# Patient Record
Sex: FEMALE | Race: WHITE | NOT HISPANIC OR LATINO | Employment: PART TIME | URBAN - NONMETROPOLITAN AREA
[De-identification: names, ages, dates, MRNs, and addresses within clinical notes are randomized per-mention and may not be internally consistent; named-entity substitution may affect disease eponyms.]

---

## 2023-05-23 NOTE — PROGRESS NOTES
"Chief Complaint  Establish Care and Vaginal Itching (Pt c/o vaginal itching/burning. Ongoing since Saturday./)    Subjective          Preston Le presents to [facility]  History of Present Illness  Here to establish care. Former pt of PTC.     ASD: She does follow with a cardiologist every 5 years.   Vaginal Discharge  The patient's primary symptoms include genital itching and vaginal discharge. The patient's pertinent negatives include no genital lesions. Pertinent negatives include no dysuria or hematuria. The vaginal discharge was white. She is sexually active. It is unknown whether or not her partner has an STD. She uses an IUD for contraception. Her past medical history is significant for vaginosis (during pregnancy). There is no history of an STD.       Objective   Vital Signs:   /71 (BP Location: Right arm, Patient Position: Sitting)   Pulse 69   Ht 149.9 cm (59\")   Wt 69.3 kg (152 lb 12.8 oz)   BMI 30.86 kg/m²     Physical Exam  Constitutional:       General: She is not in acute distress.     Appearance: Normal appearance. She is normal weight.   HENT:      Head: Normocephalic.   Eyes:      Pupils: Pupils are equal, round, and reactive to light.      Visual Fields: Right eye visual fields normal and left eye visual fields normal.   Neck:      Trachea: Trachea normal.   Cardiovascular:      Rate and Rhythm: Normal rate and regular rhythm.      Heart sounds: Normal heart sounds.   Pulmonary:      Effort: Pulmonary effort is normal.      Breath sounds: Normal breath sounds and air entry.   Musculoskeletal:      Right lower leg: No edema.      Left lower leg: No edema.   Skin:     General: Skin is warm and dry.   Neurological:      Mental Status: She is alert and oriented to person, place, and time.   Psychiatric:         Mood and Affect: Mood and affect normal.         Behavior: Behavior normal.         Thought Content: Thought content normal.        Result Review " :   The following data was reviewed by: CONRADO Gruber on 05/24/2023:                  Assessment and Plan    Diagnoses and all orders for this visit:    1. Encounter to establish care (Primary)    2. Vaginal discharge  -     Gardnerella vaginalis, Trichomonas vaginalis, Candida albicans, DNA - Swab, Vagina; Future  -     Chlamydia trachomatis, Neisseria gonorrhoeae, PCR - Urine, Urine, Random Void; Future    3. Vaginal itching  -     Chlamydia trachomatis, Neisseria gonorrhoeae, PCR - Urine, Urine, Random Void; Future    We have discussed what the vaginal swab is looking for and how positive results are treated. Will also test for GC/chlamydia.      Follow Up   Return for Pending test results.  Patient was given instructions and counseling regarding her condition or for health maintenance advice. Please see specific information pulled into the AVS if appropriate.

## 2023-05-24 ENCOUNTER — OFFICE VISIT (OUTPATIENT)
Dept: FAMILY MEDICINE CLINIC | Age: 23
End: 2023-05-24
Payer: COMMERCIAL

## 2023-05-24 ENCOUNTER — LAB (OUTPATIENT)
Dept: LAB | Facility: HOSPITAL | Age: 23
End: 2023-05-24
Payer: COMMERCIAL

## 2023-05-24 VITALS
BODY MASS INDEX: 30.8 KG/M2 | HEIGHT: 59 IN | DIASTOLIC BLOOD PRESSURE: 71 MMHG | WEIGHT: 152.8 LBS | SYSTOLIC BLOOD PRESSURE: 111 MMHG | HEART RATE: 69 BPM

## 2023-05-24 DIAGNOSIS — N89.8 VAGINAL DISCHARGE: ICD-10-CM

## 2023-05-24 DIAGNOSIS — Z76.89 ENCOUNTER TO ESTABLISH CARE: Primary | ICD-10-CM

## 2023-05-24 DIAGNOSIS — N89.8 VAGINAL ITCHING: ICD-10-CM

## 2023-05-24 DIAGNOSIS — B96.89 BV (BACTERIAL VAGINOSIS): Primary | ICD-10-CM

## 2023-05-24 DIAGNOSIS — N76.0 BV (BACTERIAL VAGINOSIS): Primary | ICD-10-CM

## 2023-05-24 LAB
CANDIDA SPECIES: NEGATIVE
GARDNERELLA VAGINALIS: POSITIVE
T VAGINALIS DNA VAG QL PROBE+SIG AMP: NEGATIVE

## 2023-05-24 PROCEDURE — 87660 TRICHOMONAS VAGIN DIR PROBE: CPT | Performed by: NURSE PRACTITIONER

## 2023-05-24 PROCEDURE — 87510 GARDNER VAG DNA DIR PROBE: CPT | Performed by: NURSE PRACTITIONER

## 2023-05-24 PROCEDURE — 87480 CANDIDA DNA DIR PROBE: CPT | Performed by: NURSE PRACTITIONER

## 2023-05-24 RX ORDER — ONDANSETRON 4 MG/1
TABLET, FILM COATED ORAL
COMMUNITY
Start: 2022-12-14 | End: 2023-05-24

## 2023-05-24 RX ORDER — METRONIDAZOLE 500 MG/1
500 TABLET ORAL 2 TIMES DAILY
Qty: 14 TABLET | Refills: 0 | Status: SHIPPED | OUTPATIENT
Start: 2023-05-24 | End: 2023-05-31

## 2023-05-24 RX ORDER — FLUDROCORTISONE ACETATE 0.1 MG/1
0.1 TABLET ORAL DAILY
Qty: 30 TABLET | Refills: 11 | COMMUNITY
Start: 2022-05-24 | End: 2023-05-24

## 2023-07-21 ENCOUNTER — TELEPHONE (OUTPATIENT)
Dept: FAMILY MEDICINE CLINIC | Age: 23
End: 2023-07-21

## 2023-07-21 DIAGNOSIS — A60.9 HSV (HERPES SIMPLEX VIRUS) ANOGENITAL INFECTION: Primary | ICD-10-CM

## 2023-07-21 RX ORDER — VALACYCLOVIR HYDROCHLORIDE 1 G/1
1000 TABLET, FILM COATED ORAL 2 TIMES DAILY
Qty: 20 TABLET | Refills: 0 | Status: SHIPPED | OUTPATIENT
Start: 2023-07-21 | End: 2023-07-31

## 2023-07-21 NOTE — TELEPHONE ENCOUNTER
Caller: Preston Le    Relationship: Self    Best call back number: [phone]     Requested Prescriptions:   VALACICLOVIR - 1 GRAM - TWICE PER DAY FOR 10 DAYS       Pharmacy where request should be sent: [place] [id number] [address] [facility] - [phone]  - [phone] FX     Last office visit with prescribing clinician: 5/24/2023   Last telemedicine visit with prescribing clinician: Visit date not found   Next office visit with prescribing clinician: Visit date not found       Does the patient have less than a 3 day supply:  [x] Yes  [] No    Would you like a call back once the refill request has been completed: [x] Yes [] No    If the office needs to give you a call back, can they leave a voicemail: [x] Yes [] No    Marciano Adams Rep   07/21/23 11:10 EDT

## 2023-09-06 ENCOUNTER — TELEPHONE (OUTPATIENT)
Dept: FAMILY MEDICINE CLINIC | Age: 23
End: 2023-09-06

## 2023-09-06 DIAGNOSIS — A60.9 HSV (HERPES SIMPLEX VIRUS) ANOGENITAL INFECTION: Primary | ICD-10-CM

## 2023-09-06 RX ORDER — VALACYCLOVIR HYDROCHLORIDE 1 G/1
1000 TABLET, FILM COATED ORAL DAILY
Qty: 90 TABLET | Refills: 1 | Status: SHIPPED | OUTPATIENT
Start: 2023-09-06

## 2023-09-06 RX ORDER — VALACYCLOVIR HYDROCHLORIDE 1 G/1
1000 TABLET, FILM COATED ORAL 2 TIMES DAILY
Qty: 20 TABLET | Refills: 0 | Status: CANCELLED | OUTPATIENT
Start: 2023-09-06

## 2023-09-06 NOTE — TELEPHONE ENCOUNTER
Caller: Preston Le    Relationship: Self    Best call back number: [phone]    Requested Prescriptions:   Requested Prescriptions      No prescriptions requested or ordered in this encounter    BIG BLUE PILL    [place] [facility] [place] [address] [facility] [address]  - [phone]  - [phone] FX     Last office visit with prescribing clinician: 5/24/2023   Last telemedicine visit with prescribing clinician: Visit date not found   Next office visit with prescribing clinician: Visit date not found     Additional details provided by patient: PATIENT SAYS ITS USED FOR HSV.  PATIENT TAKE LAST DOSE TODAY 9.6.23.    PATIENT IS ASKING FOR 90 DAY SUPPLY AND 3 REFILLS     Does the patient have less than a 3 day supply:  [x] Yes  [] No    Would you like a call back once the refill request has been completed: [] Yes [] No    If the office needs to give you a call back, can they leave a voicemail: [] Yes [] No    Ngoc Ivey, PCT   09/06/23 11:23 EDT          No

## 2023-09-06 NOTE — TELEPHONE ENCOUNTER
If you think that would help better, she would like preventative dosing, since she has had it so many times.

## 2023-09-06 NOTE — TELEPHONE ENCOUNTER
Spoke with pt regarding out break, pt states she is having multiple genital blisters, please adv.    VALCYLOVIR 1000MG #20 LF-7/21/23, pt would like a 90ds, please adv.

## 2023-10-30 ENCOUNTER — OFFICE VISIT (OUTPATIENT)
Dept: FAMILY MEDICINE CLINIC | Age: 23
End: 2023-10-30
Payer: COMMERCIAL

## 2023-10-30 ENCOUNTER — TELEPHONE (OUTPATIENT)
Dept: FAMILY MEDICINE CLINIC | Age: 23
End: 2023-10-30

## 2023-10-30 VITALS
TEMPERATURE: 97.7 F | HEIGHT: 59 IN | DIASTOLIC BLOOD PRESSURE: 64 MMHG | WEIGHT: 141 LBS | BODY MASS INDEX: 28.43 KG/M2 | HEART RATE: 90 BPM | SYSTOLIC BLOOD PRESSURE: 106 MMHG

## 2023-10-30 DIAGNOSIS — R10.9 CRAMP, ABDOMINAL: Primary | ICD-10-CM

## 2023-10-30 DIAGNOSIS — R10.32 LLQ PAIN: ICD-10-CM

## 2023-10-30 LAB
B-HCG UR QL: NEGATIVE
EXPIRATION DATE: NORMAL
INTERNAL NEGATIVE CONTROL: NORMAL
INTERNAL POSITIVE CONTROL: NORMAL
Lab: NORMAL

## 2023-10-30 PROCEDURE — 81025 URINE PREGNANCY TEST: CPT | Performed by: NURSE PRACTITIONER

## 2023-10-30 PROCEDURE — 1159F MED LIST DOCD IN RCRD: CPT | Performed by: NURSE PRACTITIONER

## 2023-10-30 PROCEDURE — 1160F RVW MEDS BY RX/DR IN RCRD: CPT | Performed by: NURSE PRACTITIONER

## 2023-10-30 PROCEDURE — 99213 OFFICE O/P EST LOW 20 MIN: CPT | Performed by: NURSE PRACTITIONER

## 2023-10-30 RX ORDER — ESCITALOPRAM OXALATE 10 MG/1
1 TABLET ORAL DAILY
COMMUNITY
Start: 2023-10-20

## 2023-10-30 NOTE — TELEPHONE ENCOUNTER
Pt seen in office today and adv to go to ED due to severe abdominal cramping, vaginal bleeding, hcg neg. Upon exam LLQ very tender. Vs given to Tanna at [facility] ED. Pt reports IUD without menses since 7/2022 only occasional spotting.

## 2023-10-30 NOTE — PROGRESS NOTES
"Chief Complaint  Abdominal Cramping (Severe abdominal cramping since this morning in lower stomach. When she got up to use the bathroom some blood came out in toilet but that was it. She said she hasn't had a period in a year and a half. )    Subjective          Preston Le presents to [facility]  History of Present Illness  She reports having severe cramping this morning. She reports that it woke her up from the sleep. She reports that that when she went to use the toilet, blood came out of her vaginal area. The blood was bright to dark red in color. She took naproxen, which did help with her cramping. She has not had any more episodes of bleeding. She does have a IUD, which has been in place since July 2022. She does not check her strings. Her last menstrual period has been almost a year.       Objective   Vital Signs:   /64 (BP Location: Left arm, Patient Position: Sitting, Cuff Size: Adult)   Pulse 90   Temp 97.7 °F (36.5 °C) (Oral)   Ht 149.9 cm (59.02\")   Wt 64 kg (141 lb)   BMI 28.46 kg/m²     Physical Exam  Constitutional:       General: She is not in acute distress.     Appearance: Normal appearance. She is normal weight.   HENT:      Head: Normocephalic.   Eyes:      Pupils: Pupils are equal, round, and reactive to light.      Visual Fields: Right eye visual fields normal and left eye visual fields normal.   Neck:      Trachea: Trachea normal.   Cardiovascular:      Rate and Rhythm: Normal rate and regular rhythm.      Heart sounds: Normal heart sounds.   Pulmonary:      Effort: Pulmonary effort is normal.      Breath sounds: Normal breath sounds and air entry.   Abdominal:      General: There is no distension.      Palpations: There is no mass.      Tenderness: There is abdominal tenderness (generalized, but worse in LLQ).   Musculoskeletal:      Right lower leg: No edema.      Left lower leg: No edema.   Skin:     General: Skin is warm and dry. "   Neurological:      Mental Status: She is alert and oriented to person, place, and time.   Psychiatric:         Mood and Affect: Mood and affect normal.         Behavior: Behavior normal.         Thought Content: Thought content normal.        Result Review :   The following data was reviewed by: CONRADO Gruber on 10/30/2023:                  Assessment and Plan    Diagnoses and all orders for this visit:    1. Cramp, abdominal (Primary)  -     POCT pregnancy, urine    2. LLQ pain    Upon laying down, the patient complained of severe pain in her lower abdomen.  She was extremely tender upon palpation, especially in the left lower quadrant region.  Due to this, I recommended going to the ED so she can get stat imaging done, along with blood work.  We have also discussed that it may be gynecological in nature, but could be other things so that is why she needs to have stat imaging.  Her urine pregnancy test was negative.  She verbalized understanding and is going to [facility].  We have called report to [facility].      Follow Up   No follow-ups on file.  Patient was given instructions and counseling regarding her condition or for health maintenance advice. Please see specific information pulled into the AVS if appropriate.

## 2024-03-04 ENCOUNTER — OFFICE VISIT (OUTPATIENT)
Dept: FAMILY MEDICINE CLINIC | Age: 24
End: 2024-03-04
Payer: COMMERCIAL

## 2024-03-04 VITALS
DIASTOLIC BLOOD PRESSURE: 71 MMHG | HEIGHT: 59 IN | TEMPERATURE: 98.4 F | OXYGEN SATURATION: 98 % | SYSTOLIC BLOOD PRESSURE: 102 MMHG | BODY MASS INDEX: 25.32 KG/M2 | WEIGHT: 125.6 LBS | HEART RATE: 107 BPM

## 2024-03-04 DIAGNOSIS — N76.0 ACUTE VAGINITIS: Primary | ICD-10-CM

## 2024-03-04 LAB
CANDIDA SPECIES: POSITIVE
GARDNERELLA VAGINALIS: NEGATIVE
T VAGINALIS DNA VAG QL PROBE+SIG AMP: NEGATIVE

## 2024-03-04 PROCEDURE — 87660 TRICHOMONAS VAGIN DIR PROBE: CPT | Performed by: NURSE PRACTITIONER

## 2024-03-04 PROCEDURE — 99213 OFFICE O/P EST LOW 20 MIN: CPT | Performed by: NURSE PRACTITIONER

## 2024-03-04 PROCEDURE — 87510 GARDNER VAG DNA DIR PROBE: CPT | Performed by: NURSE PRACTITIONER

## 2024-03-04 PROCEDURE — 87480 CANDIDA DNA DIR PROBE: CPT | Performed by: NURSE PRACTITIONER

## 2024-03-04 RX ORDER — FLUCONAZOLE 150 MG/1
150 TABLET ORAL
Qty: 2 TABLET | Refills: 0 | Status: SHIPPED | OUTPATIENT
Start: 2024-03-04 | End: 2024-03-12

## 2024-03-04 NOTE — PROGRESS NOTES
Chief Complaint  Preston Le presents to [facility] for Vaginal Itching (HSV, pt states this is different from her usual outbreaks /Painful to wipe X 3 days /Redness & itchiness X 3 days )      Subjective     History of Present Illness    Vaginitis: Patient complains of an abnormal vaginal irritation for 3 days. Vaginal symptoms include local irritation, pain, and vulvar itching.STI Risk: HSV current diagnosis but takes valtrex daily.  Discharge described as: normal and physiologic..Menstrual pattern: She had been bleeding no periods due to OC. Contraception: OCP (estrogen/progesterone)  no sexual intercourse  did recently started on tx for strep with antibiotic but unsure as the name (allergy to amoxicillin).  History of BV           Assessment and Plan       Diagnoses and all orders for this visit:    1. Acute vaginitis (Primary)  Comments:  WILL TREAT AS YEAST DUE TO RECENT ABX USE- PENDING VAG SCREEN  CONSIDER BV SINCE HISTORY AND ADVISE TO USE OTC MONISTAT EXTERNALLY  Orders:  -     Gardnerella vaginalis, Trichomonas vaginalis, Candida albicans, DNA - Swab, Vagina; Future  -     Gardnerella vaginalis, Trichomonas vaginalis, Candida albicans, DNA - Swab, Vagina  -     fluconazole (Diflucan) 150 MG tablet; Take 1 tablet by mouth Every 7 (Seven) Days for 2 doses.  Dispense: 2 tablet; Refill: 0            Follow Up   Return if symptoms worsen or fail to improve.  No future appointments.    New Medications Ordered This Visit   Medications    fluconazole (Diflucan) 150 MG tablet     Sig: Take 1 tablet by mouth Every 7 (Seven) Days for 2 doses.     Dispense:  2 tablet     Refill:  0       There are no discontinued medications.       Review of Systems    Objective     Vitals:    03/04/24 1319   BP: 102/71   BP Location: Left arm   Patient Position: Sitting   Cuff Size: Adult   Pulse: 107   Temp: 98.4 °F (36.9 °C)   TempSrc: Oral   SpO2: 98%   Weight: 57 kg (125 lb 9.6 oz)   Height:  "149.9 cm (59.02\")     Body mass index is 25.35 kg/m².          Physical Exam  Constitutional:       General: She is not in acute distress.     Appearance: Normal appearance.   HENT:      Head: Normocephalic.   Cardiovascular:      Rate and Rhythm: Normal rate and regular rhythm.   Pulmonary:      Effort: Pulmonary effort is normal.      Breath sounds: Normal breath sounds.   Genitourinary:     Pubic Area: No rash.       Labia:         Right: Tenderness present. No lesion.         Left: No rash or lesion.       Vagina: Vaginal discharge (THICK WHITE) present.       Musculoskeletal:         General: Normal range of motion.   Neurological:      General: No focal deficit present.      Mental Status: She is alert and oriented to person, place, and time.   Psychiatric:         Mood and Affect: Mood normal.         Behavior: Behavior normal.            Result Review               Allergies   Allergen Reactions    Amoxicillin Anaphylaxis, Hives and Rash     Category: Allergy;     Other reaction(s): anaphylasis, Rash  Category: Allergy;     Other reaction(s): anaphylasis, Rash      Bupropion Anxiety and Hives     Category: Allergy;   Other reaction(s): hives  Category: Allergy;   Other reaction(s): hives      Vancomycin Itching and Rash     Anger      Past Medical History:   Diagnosis Date    Allergic     Anemia     Anxiety     Depression     Headache     Heart murmur      Current Outpatient Medications   Medication Sig Dispense Refill    escitalopram (LEXAPRO) 10 MG tablet Take 1 tablet by mouth Daily.      Levonorgestrel 20.1 MCG/DAY intrauterine device 1 Device by Intrauterine route.      valACYclovir (Valtrex) 1000 MG tablet Take 1 tablet by mouth Daily. 90 tablet 1    fluconazole (Diflucan) 150 MG tablet Take 1 tablet by mouth Every 7 (Seven) Days for 2 doses. 2 tablet 0     No current facility-administered medications for this visit.     Past Surgical History:   Procedure Laterality Date    UMBILICAL HERNIA REPAIR      "   Health Maintenance Due   Topic Date Due    Pneumococcal Vaccine 0-64 (1 of 2 - PCV) Never done    HPV VACCINES (1 - 3-dose series) Never done    HEPATITIS C SCREENING  Never done    ANNUAL PHYSICAL  Never done    PAP SMEAR  Never done    INFLUENZA VACCINE  08/01/2023    COVID-19 Vaccine (1 - 2023-24 season) Never done      Immunization History   Administered Date(s) Administered    Influenza Whole 12/26/2015    Tdap 09/27/2016, 08/03/2021    Varicella 12/17/2016         Part of this note may be an electronic transcription/translation of spoken language to printed   text using the Dragon Dictation System.      Gissel Mcrae, APRN

## 2024-03-08 DIAGNOSIS — A60.9 HSV (HERPES SIMPLEX VIRUS) ANOGENITAL INFECTION: ICD-10-CM

## 2024-03-08 RX ORDER — VALACYCLOVIR HYDROCHLORIDE 1 G/1
1000 TABLET, FILM COATED ORAL DAILY
Qty: 90 TABLET | Refills: 1 | OUTPATIENT
Start: 2024-03-08

## 2024-03-08 NOTE — TELEPHONE ENCOUNTER
Rx Refill Note  Requested Prescriptions     Pending Prescriptions Disp Refills    valACYclovir (VALTREX) 1000 MG tablet [Pharmacy Med Name: valacyclovir 1 gram tablet] 90 tablet 1     Sig: TAKE ONE TABLET BY MOUTH EVERY DAY      Last office visit with prescribing clinician: 10/30/2023     Next office visit with prescribing clinician: Visit date not found     9/6/23 #90 1 refill    Johanna Jones LPN  03/08/24, 09:52 EST

## 2024-03-08 NOTE — TELEPHONE ENCOUNTER
Spoke with pt who has 2 wks left of medication, she will call back once she has her schedule for work and schedule an appt with pcp. Med denied for now.

## 2024-03-22 ENCOUNTER — TELEPHONE (OUTPATIENT)
Dept: FAMILY MEDICINE CLINIC | Age: 24
End: 2024-03-22

## 2024-03-22 DIAGNOSIS — A60.9 HSV (HERPES SIMPLEX VIRUS) ANOGENITAL INFECTION: ICD-10-CM

## 2024-03-22 RX ORDER — VALACYCLOVIR HYDROCHLORIDE 1 G/1
1000 TABLET, FILM COATED ORAL DAILY
Qty: 90 TABLET | Refills: 1 | OUTPATIENT
Start: 2024-03-22
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
## 2024-03-22 NOTE — TELEPHONE ENCOUNTER
Rx Refill Note  Requested Prescriptions     Pending Prescriptions Disp Refills    valACYclovir (Valtrex) 1000 MG tablet 90 tablet 1     Sig: Take 1 tablet by mouth Daily.      Last office visit with prescribing clinician: 10/30/2023   Last telemedicine visit with prescribing clinician: Visit date not found   Next office visit with prescribing clinician: Visit date not found   date:23}     Would you like a call back once the refill request has been completed: [] Yes [] No    If the office needs to give you a call back, can they leave a voicemail: [] Yes [] No    Lexie Weaver LPN  03/22/24, 10:20 EDT      PLEASE SEE PT NOTE    LF-9/6/23 #90, RF-1
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
## 2024-03-25 ENCOUNTER — OFFICE VISIT (OUTPATIENT)
Dept: FAMILY MEDICINE CLINIC | Age: 24
End: 2024-03-25
Payer: COMMERCIAL

## 2024-03-25 ENCOUNTER — TELEPHONE (OUTPATIENT)
Dept: FAMILY MEDICINE CLINIC | Age: 24
End: 2024-03-25

## 2024-03-25 VITALS
HEART RATE: 80 BPM | BODY MASS INDEX: 25.6 KG/M2 | WEIGHT: 127 LBS | HEIGHT: 59 IN | SYSTOLIC BLOOD PRESSURE: 108 MMHG | DIASTOLIC BLOOD PRESSURE: 70 MMHG | TEMPERATURE: 98 F

## 2024-03-25 DIAGNOSIS — A60.9 HSV (HERPES SIMPLEX VIRUS) ANOGENITAL INFECTION: Primary | ICD-10-CM

## 2024-03-25 DIAGNOSIS — K63.9 COLON WALL THICKENING: ICD-10-CM

## 2024-03-25 PROCEDURE — 99214 OFFICE O/P EST MOD 30 MIN: CPT | Performed by: NURSE PRACTITIONER

## 2024-03-25 PROCEDURE — 1160F RVW MEDS BY RX/DR IN RCRD: CPT | Performed by: NURSE PRACTITIONER

## 2024-03-25 PROCEDURE — 1159F MED LIST DOCD IN RCRD: CPT | Performed by: NURSE PRACTITIONER

## 2024-03-25 RX ORDER — QUETIAPINE FUMARATE 50 MG/1
TABLET, EXTENDED RELEASE ORAL
COMMUNITY
Start: 2024-03-04

## 2024-03-25 RX ORDER — VALACYCLOVIR HYDROCHLORIDE 1 G/1
1000 TABLET, FILM COATED ORAL DAILY
Qty: 90 TABLET | Refills: 1 | Status: SHIPPED | OUTPATIENT
Start: 2024-03-25

## 2024-03-25 RX ORDER — FLUOXETINE 10 MG/1
1 CAPSULE ORAL DAILY
COMMUNITY
Start: 2024-03-04

## 2024-03-25 NOTE — TELEPHONE ENCOUNTER
Pt was going to be called about the appt that was no showed on 3/25/24 but she had already rescheduled for the same day. She will be sent a letter to remind her of the no show policy. This is the second no show within this month.

## 2024-03-25 NOTE — PROGRESS NOTES
"Chief Complaint  Med Refill (Medication refills )    Subjective          Preston Le presents to [facility]  History of Present Illness  She has been taking valacyclovir 1000 mg daily for suppression of herpes.  She is needing a refill on the medication. She has not had any outbreaks since taking the medication.     In October last year, she was having a lot of left lower quadrant pain.  The CT of her abdomen showed mild wall thickening of the rectum without surrounding inflammation.  It was recommended to undergo a CT, but when she tried calling a gastro office in [place], they told her that she had to be 24 to have a colonoscopy.  She has not had any issues since October.      Objective   Vital Signs:   /70 (BP Location: Left arm, Patient Position: Sitting, Cuff Size: Adult)   Pulse 80   Temp 98 °F (36.7 °C) (Oral)   Ht 149.9 cm (59.02\")   Wt 57.6 kg (127 lb)   BMI 25.63 kg/m²     Physical Exam  Constitutional:       General: She is not in acute distress.     Appearance: Normal appearance. She is normal weight.   HENT:      Head: Normocephalic.   Eyes:      Pupils: Pupils are equal, round, and reactive to light.      Visual Fields: Right eye visual fields normal and left eye visual fields normal.   Neck:      Trachea: Trachea normal.   Cardiovascular:      Rate and Rhythm: Normal rate and regular rhythm.      Heart sounds: Normal heart sounds.   Pulmonary:      Effort: Pulmonary effort is normal.      Breath sounds: Normal breath sounds and air entry.   Musculoskeletal:      Right lower leg: No edema.      Left lower leg: No edema.   Skin:     General: Skin is warm and dry.   Neurological:      Mental Status: She is alert and oriented to person, place, and time.   Psychiatric:         Mood and Affect: Mood and affect normal.         Behavior: Behavior normal.         Thought Content: Thought content normal.        Result Review :   The following data was reviewed " by: CONRADO Gruber on 03/25/2024:                  Assessment and Plan    Diagnoses and all orders for this visit:    1. HSV (herpes simplex virus) anogenital infection (Primary)  -     valACYclovir (Valtrex) 1000 MG tablet; Take 1 tablet by mouth Daily.  Dispense: 90 tablet; Refill: 1    2. Colon wall thickening  Comments:  Will refer to gastro for colonoscopy due to her abnormal imaging October.  Orders:  -     Ambulatory Referral to Gastroenterology          Follow Up   Return in about 6 months (around 9/25/2024) for Annual physical.  Patient was given instructions and counseling regarding her condition or for health maintenance advice. Please see specific information pulled into the AVS if appropriate.

## 2024-08-29 ENCOUNTER — TELEPHONE (OUTPATIENT)
Dept: GASTROENTEROLOGY | Facility: CLINIC | Age: 24
End: 2024-08-29

## 2024-08-29 NOTE — TELEPHONE ENCOUNTER
Attempted to contact/Contacted Preston eL 2000 regarding the appointment no show with CONRADO Farnsworth on 08.29.24 @ 10:15. Patient is aware that there is a 24-hour cancellation policy and understands that a no-show letter will be mailed to them at the address on file. [facility]

## 2024-08-29 NOTE — TELEPHONE ENCOUNTER
Called Preston Le 2000 to confirm their appointment with CONRADO Farnsworth on 08.29.24 @ 10:15. I was unable to reach the patient to confirm the appointment. The patient has been made aware of our 24 hour cancellation policy. [facility]

## 2024-09-11 DIAGNOSIS — A60.9 HSV (HERPES SIMPLEX VIRUS) ANOGENITAL INFECTION: ICD-10-CM

## 2024-09-11 RX ORDER — VALACYCLOVIR HYDROCHLORIDE 1 G/1
1000 TABLET, FILM COATED ORAL DAILY
Qty: 90 TABLET | Refills: 1 | Status: SHIPPED | OUTPATIENT
Start: 2024-09-11

## 2024-09-11 NOTE — TELEPHONE ENCOUNTER
Passed protocol    LOV  3/25/24    LF  8/13/24  #30    COMPREHENSIVE METABOLIC PANEL (10/30/2023 12:33)

## 2024-12-10 ENCOUNTER — HOSPITAL ENCOUNTER (INPATIENT)
Facility: HOSPITAL | Age: 24
LOS: 3 days | Discharge: HOME | End: 2024-12-13
Attending: PSYCHIATRY & NEUROLOGY | Admitting: PSYCHIATRY & NEUROLOGY
Payer: COMMERCIAL

## 2024-12-10 PROBLEM — F25.9 SCHIZOAFFECTIVE DISORDER: Status: ACTIVE | Noted: 2024-12-10

## 2024-12-10 RX ORDER — NICOTINE 21 MG/24HR
1 PATCH, TRANSDERMAL 24 HOURS TRANSDERMAL DAILY PRN
Status: DISCONTINUED | OUTPATIENT
Start: 2024-12-10 | End: 2024-12-11

## 2024-12-10 RX ORDER — TRAZODONE HYDROCHLORIDE 100 MG/1
100 TABLET ORAL NIGHTLY PRN
Status: DISCONTINUED | OUTPATIENT
Start: 2024-12-10 | End: 2024-12-12

## 2024-12-10 RX ORDER — HALOPERIDOL 5 MG/ML
5 INJECTION INTRAMUSCULAR EVERY 4 HOURS PRN
Status: DISCONTINUED | OUTPATIENT
Start: 2024-12-10 | End: 2024-12-13 | Stop reason: HOSPADM

## 2024-12-10 RX ORDER — HYDROXYZINE HYDROCHLORIDE 25 MG/1
50 TABLET, FILM COATED ORAL EVERY 6 HOURS PRN
Status: DISCONTINUED | OUTPATIENT
Start: 2024-12-10 | End: 2024-12-10

## 2024-12-10 RX ORDER — ALUMINA, MAGNESIA, AND SIMETHICONE 2400; 2400; 240 MG/30ML; MG/30ML; MG/30ML
15 SUSPENSION ORAL EVERY 6 HOURS PRN
Status: DISCONTINUED | OUTPATIENT
Start: 2024-12-10 | End: 2024-12-13 | Stop reason: HOSPADM

## 2024-12-10 RX ORDER — ACETAMINOPHEN 325 MG/1
650 TABLET ORAL EVERY 6 HOURS PRN
Status: DISCONTINUED | OUTPATIENT
Start: 2024-12-10 | End: 2024-12-13 | Stop reason: HOSPADM

## 2024-12-10 RX ORDER — HALOPERIDOL 5 MG/1
5 TABLET ORAL EVERY 4 HOURS PRN
Status: DISCONTINUED | OUTPATIENT
Start: 2024-12-10 | End: 2024-12-13 | Stop reason: HOSPADM

## 2024-12-10 RX ORDER — HYDROXYZINE HYDROCHLORIDE 25 MG/1
25 TABLET, FILM COATED ORAL ONCE
Status: COMPLETED | OUTPATIENT
Start: 2024-12-11 | End: 2024-12-10

## 2024-12-10 RX ORDER — LOPERAMIDE HYDROCHLORIDE 2 MG/1
2 CAPSULE ORAL
Status: DISCONTINUED | OUTPATIENT
Start: 2024-12-10 | End: 2024-12-13 | Stop reason: HOSPADM

## 2024-12-10 RX ADMIN — HYDROXYZINE HYDROCHLORIDE 25 MG: 25 TABLET ORAL at 23:20

## 2024-12-10 RX ADMIN — NICOTINE 1 PATCH: 21 PATCH, EXTENDED RELEASE TRANSDERMAL at 23:20

## 2024-12-11 LAB
AMPHET+METHAMPHET UR QL: NEGATIVE
AMPHETAMINES UR QL: NEGATIVE
B-HCG UR QL: POSITIVE
BARBITURATES UR QL SCN: NEGATIVE
BENZODIAZ UR QL SCN: NEGATIVE
BUPRENORPHINE SERPL-MCNC: NEGATIVE NG/ML
CANNABINOIDS SERPL QL: POSITIVE
COCAINE UR QL: NEGATIVE
FENTANYL UR-MCNC: NEGATIVE NG/ML
METHADONE UR QL SCN: NEGATIVE
OPIATES UR QL: NEGATIVE
OXYCODONE UR QL SCN: NEGATIVE
PCP UR QL SCN: NEGATIVE
TRICYCLICS UR QL SCN: NEGATIVE

## 2024-12-11 PROCEDURE — 80307 DRUG TEST PRSMV CHEM ANLYZR: CPT | Performed by: PSYCHIATRY & NEUROLOGY

## 2024-12-11 PROCEDURE — 81025 URINE PREGNANCY TEST: CPT | Performed by: PSYCHIATRY & NEUROLOGY

## 2024-12-11 RX ORDER — VALACYCLOVIR HYDROCHLORIDE 500 MG/1
1000 TABLET, FILM COATED ORAL DAILY
Status: DISCONTINUED | OUTPATIENT
Start: 2024-12-11 | End: 2024-12-13 | Stop reason: HOSPADM

## 2024-12-11 RX ORDER — ONDANSETRON 4 MG/1
4 TABLET, FILM COATED ORAL EVERY 8 HOURS PRN
COMMUNITY
Start: 2024-12-06 | End: 2024-12-13 | Stop reason: HOSPADM

## 2024-12-11 RX ORDER — PROMETHAZINE HYDROCHLORIDE 25 MG/1
25 TABLET ORAL EVERY 8 HOURS PRN
COMMUNITY

## 2024-12-11 RX ORDER — PYRIDOXINE HCL (VITAMIN B6) 25 MG
25 TABLET ORAL DAILY
COMMUNITY
Start: 2024-11-06 | End: 2024-12-13 | Stop reason: HOSPADM

## 2024-12-11 RX ORDER — POLYETHYLENE GLYCOL 3350 17 G
2 POWDER IN PACKET (EA) ORAL
Status: DISCONTINUED | OUTPATIENT
Start: 2024-12-11 | End: 2024-12-13 | Stop reason: HOSPADM

## 2024-12-11 RX ORDER — FLUOXETINE 10 MG/1
10 CAPSULE ORAL DAILY
Status: DISCONTINUED | OUTPATIENT
Start: 2024-12-11 | End: 2024-12-13 | Stop reason: HOSPADM

## 2024-12-11 RX ORDER — PROMETHAZINE HYDROCHLORIDE 12.5 MG/1
12.5 TABLET ORAL EVERY 6 HOURS PRN
Status: DISCONTINUED | OUTPATIENT
Start: 2024-12-11 | End: 2024-12-13 | Stop reason: HOSPADM

## 2024-12-11 RX ORDER — HYDROXYZINE HYDROCHLORIDE 25 MG/1
25 TABLET, FILM COATED ORAL NIGHTLY PRN
COMMUNITY
Start: 2024-09-16 | End: 2024-12-13 | Stop reason: HOSPADM

## 2024-12-11 RX ADMIN — VALACYCLOVIR 1000 MG: 500 TABLET ORAL at 11:13

## 2024-12-11 RX ADMIN — TRAZODONE HYDROCHLORIDE 100 MG: 100 TABLET ORAL at 21:41

## 2024-12-11 RX ADMIN — ACETAMINOPHEN 650 MG: 325 TABLET ORAL at 09:23

## 2024-12-11 RX ADMIN — NICOTINE POLACRILEX 2 MG: 2 LOZENGE ORAL at 12:57

## 2024-12-11 RX ADMIN — NICOTINE POLACRILEX 2 MG: 2 LOZENGE ORAL at 18:11

## 2024-12-11 RX ADMIN — NICOTINE POLACRILEX 2 MG: 2 LOZENGE ORAL at 20:58

## 2024-12-11 RX ADMIN — FLUOXETINE HYDROCHLORIDE 10 MG: 10 CAPSULE ORAL at 14:12
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
## 2024-12-11 NOTE — PLAN OF CARE
Goal Outcome Evaluation:      Progress: no change.  Patient newly arrived to unit.  Patient has been assessed and plan of care initiated based on needs, goals, and treatment.  Patient participated in assessments and was encouraged to make needs known to staff. Patient verbalized understanding. Supportive care and safe environment provided. See Admission Note.

## 2024-12-11 NOTE — PLAN OF CARE
"Goal Outcome Evaluation:  Plan of Care Reviewed With: patient  Patient Agreement with Plan of Care: agrees                                Patient has been calm and cooperative throughout shift. Patient denies SI and HI. Patient reports A/VH, but says she has always heard voices and they are not bothersome. Reports \"little\" anxiety and depression. Patient interacts appropriate with other peers and staff. Safe environment provided.   "

## 2024-12-11 NOTE — PLAN OF CARE
Goal Outcome Evaluation:                                        Pt has been sleeping in room. No change since arrival. She is able to make her needs known. Will continue to monitor and provide safe environment.
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
## 2024-12-12 RX ORDER — TRAZODONE HYDROCHLORIDE 50 MG/1
50 TABLET, FILM COATED ORAL NIGHTLY PRN
Status: DISCONTINUED | OUTPATIENT
Start: 2024-12-12 | End: 2024-12-13 | Stop reason: HOSPADM

## 2024-12-12 RX ORDER — PRENATAL VIT/IRON FUM/FOLIC AC 27MG-0.8MG
1 TABLET ORAL DAILY
Status: DISCONTINUED | OUTPATIENT
Start: 2024-12-12 | End: 2024-12-13 | Stop reason: HOSPADM

## 2024-12-12 RX ADMIN — NICOTINE POLACRILEX 2 MG: 2 LOZENGE ORAL at 11:21

## 2024-12-12 RX ADMIN — NICOTINE POLACRILEX 2 MG: 2 LOZENGE ORAL at 19:40

## 2024-12-12 RX ADMIN — NICOTINE POLACRILEX 2 MG: 2 LOZENGE ORAL at 21:42

## 2024-12-12 RX ADMIN — FLUOXETINE HYDROCHLORIDE 10 MG: 10 CAPSULE ORAL at 08:13

## 2024-12-12 RX ADMIN — ACETAMINOPHEN 650 MG: 325 TABLET ORAL at 11:21

## 2024-12-12 RX ADMIN — VITAMIN A, VITAMIN C, VITAMIN D, VITAMIN E, THIAMINE, RIBOFLAVIN, NIACIN, VITAMIN B6, FOLIC ACID, VITAMIN B12, CALCIUM, IRON, ZINC, COPPER 1 TABLET: 4000; 120; 400; 22; 1.84; 3; 20; 10; 1; 12; 200; 27; 25; 2 TABLET ORAL at 08:35

## 2024-12-12 RX ADMIN — NICOTINE POLACRILEX 2 MG: 2 LOZENGE ORAL at 17:05

## 2024-12-12 RX ADMIN — VALACYCLOVIR 1000 MG: 500 TABLET ORAL at 08:13

## 2024-12-12 RX ADMIN — NICOTINE POLACRILEX 2 MG: 2 LOZENGE ORAL at 14:45

## 2024-12-12 RX ADMIN — NICOTINE POLACRILEX 2 MG: 2 LOZENGE ORAL at 08:13

## 2024-12-12 RX ADMIN — TRAZODONE HYDROCHLORIDE 50 MG: 50 TABLET ORAL at 21:44

## 2024-12-12 NOTE — PLAN OF CARE
Goal Outcome Evaluation:  Plan of Care Reviewed With: patient  Patient Agreement with Plan of Care: agrees                                Patient has been calm and cooperative throughout shift. Patient denies anxiety, depression, SI, HI, and A/VH. Patient is hopeful for d/c tomorrow and reports discussing discharge plan with nurse navigator. Patient has been in day room interacting with peers appropriately and attends group. Safe environment provided.

## 2024-12-12 NOTE — PLAN OF CARE
Goal Outcome Evaluation:               Pt is alert and oriented and able to make needs known.  Pt denies SI or HI.  Pt denies a/v/h.  Pt has been observed sleeping well throughout shift. No s/s of acute distress observed at this time.

## 2024-12-12 NOTE — PROGRESS NOTES
" [facility]     Psychiatric Progress Note    Patient Name: Preston Le  : 2000  MRN: [id number]  Primary Care Physician:  Provider, No Known  Date of admission: 12/10/2024    Subjective   Subjective     Patient seen and chart reviewed, discussed with staff.    Chief Complaint: Psychosis, depression, admitted on an MIW      HPI:     Staff reports the patient slept well.  Been denying suicidal or homicidal ideations as well as any hallucinations.    Patient today reports that she slept well but is feeling somewhat groggy and will reduce her dose of trazodone.  States that she talked to her mom and her  and was conversations well.  She is planning on staying with her , who has the children, after discharge for a few days.  States that she needs to figure out how to get current boyfriend's mom out of her house.  Does not want any conflict or trouble.    Reports that she is \"so much better\" today and is feeling well.  She is denying suicidal homicidal ideation.  She states the medications have helped and she no longer feels like she wants to \"flip out.\"      Objective   Objective     Vitals:   Temp:  [97.5 °F (36.4 °C)-97.7 °F (36.5 °C)] 97.7 °F (36.5 °C)  Heart Rate:  [71-92] 71  Resp:  [16-20] 20  BP: (106-111)/(52-56) 106/56          Mental Status Exam:      Appearance:   Well-developed, nourished, calm, cooperative, engaging  Reliability:   Good  Eye Contact:   Good  Concentration/Focus:    Attentive  Behaviors:    Appropriate  Memory :    Intact  Speech:    Normal  Language:   Appropriate  Mood :    \"So much better\" reports it as being good  Affect:    Euthymic  Thought process:    Goal-directed, linear  Thought Content:    Denies suicidal or homicidal ideation, no auditory visual loose nations  Insight:   Good  Judgement:    Appropriate, no behavioral disturbance      Result Review    Result Review:  I have personally reviewed the results from the time of this admission to 2024 " 11:27 EST and agree with these findings:  []  Laboratory  []  Microbiology  []  Radiology  []  EKG/Telemetry   []  Cardiology/Vascular   []  Pathology  []  Old records  []  Other:  Most notable findings include:     Lab Results (last 24 hours)       ** No results found for the last 24 hours. **                Medications:   FLUoxetine, 10 mg, Oral, Daily  prenatal vitamin, 1 tablet, Oral, Daily  valACYclovir, 1,000 mg, Oral, Daily          Assessment / Plan       Active Hospital Problems:  Active Hospital Problems    Diagnosis     **Schizoaffective disorder        Plan:     Decrease trazodone to 50 mg   Patiently significantly improved we will continue to monitor mood and affect  Appears MIW will be able to be dropped and the patient will be able to be discharged home.  work on mood stabilization and abatement of any suicidal ideation or psychosis.  Work on appropriate safety plan  Continue supportive therapy  Patient to engage in all group and individual treatment modalities available on the unit  Obtain collateral information if possible  Titrate medications as clinically indicated  Work on appropriate disposition follow-up including referrals to substance abuse treatment if indicated      Disposition:  I expect patient to be discharged tomorrow at the end of her initial 72-hour evaluation..    Part of this note may be an electronic transcription/translation of spoken language to printed text using the Dragon dictation system.         Electronically signed by Dayron Sheridan MD, 12/12/24, 11:27 AM EST.

## 2024-12-13 VITALS
SYSTOLIC BLOOD PRESSURE: 107 MMHG | WEIGHT: 110.8 LBS | HEART RATE: 56 BPM | HEIGHT: 59 IN | TEMPERATURE: 97.7 F | DIASTOLIC BLOOD PRESSURE: 55 MMHG | OXYGEN SATURATION: 100 % | BODY MASS INDEX: 22.34 KG/M2 | RESPIRATION RATE: 18 BRPM

## 2024-12-13 PROBLEM — Z3A.14 14 WEEKS GESTATION OF PREGNANCY: Status: ACTIVE | Noted: 2024-11-04

## 2024-12-13 PROBLEM — A60.00 HERPES SIMPLEX INFECTION OF GENITOURINARY SYSTEM: Status: ACTIVE | Noted: 2024-10-28

## 2024-12-13 PROCEDURE — 63710000001 PROMETHAZINE PER 12.5 MG: Performed by: PSYCHIATRY & NEUROLOGY

## 2024-12-13 RX ORDER — PNV NO.95/FERROUS FUM/FOLIC AC 28MG-0.8MG
1 TABLET ORAL DAILY
Qty: 30 TABLET | Refills: 1 | Status: SHIPPED | OUTPATIENT
Start: 2024-12-13

## 2024-12-13 RX ORDER — FLUOXETINE 10 MG/1
10 CAPSULE ORAL DAILY
Qty: 30 CAPSULE | Refills: 1 | Status: SHIPPED | OUTPATIENT
Start: 2024-12-13

## 2024-12-13 RX ORDER — TRAZODONE HYDROCHLORIDE 50 MG/1
50 TABLET, FILM COATED ORAL NIGHTLY PRN
Qty: 30 TABLET | Refills: 1 | Status: SHIPPED | OUTPATIENT
Start: 2024-12-13

## 2024-12-13 RX ADMIN — VALACYCLOVIR 1000 MG: 500 TABLET ORAL at 09:13

## 2024-12-13 RX ADMIN — VITAMIN A, VITAMIN C, VITAMIN D, VITAMIN E, THIAMINE, RIBOFLAVIN, NIACIN, VITAMIN B6, FOLIC ACID, VITAMIN B12, CALCIUM, IRON, ZINC, COPPER 1 TABLET: 4000; 120; 400; 22; 1.84; 3; 20; 10; 1; 12; 200; 27; 25; 2 TABLET ORAL at 09:13

## 2024-12-13 RX ADMIN — FLUOXETINE HYDROCHLORIDE 10 MG: 10 CAPSULE ORAL at 09:13

## 2024-12-13 RX ADMIN — PROMETHAZINE HYDROCHLORIDE 12.5 MG: 12.5 TABLET ORAL at 07:41

## 2024-12-13 RX ADMIN — NICOTINE POLACRILEX 2 MG: 2 LOZENGE ORAL at 11:02

## 2024-12-13 NOTE — PLAN OF CARE
Goal Outcome Evaluation:  Plan of Care Reviewed With: patient  Plan of Care Reviewed With: patient  Patient Agreement with Plan of Care: agrees     Progress: improving        Patient calm and cooperative. Denies suicidal and homicidal ideations. Denies a/v hallucinations. Rates anxiety 5, depression 0. Safety plan completed by patient prior to discharge.

## 2024-12-13 NOTE — PLAN OF CARE
Goal Outcome Evaluation:  Plan of Care Reviewed With: patient  Patient Agreement with Plan of Care: agrees    Pt is alert and oriented and able to make needs known.  Pt denies SI or HI.  Pt denies a/v/h.  Pt reports that she hopes to discharge today.  Pt spent time in the day room, watching TV with peers and made phone calls this evening.  Pt had snack and cooperated with all meds and assessments.  No s/s of acute distress observed at this time.
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
## 2024-12-13 NOTE — CASE MANAGEMENT/SOCIAL WORK
Disposition: The patient reports at discharge her plan is to return to her spouse's home in [place], [place]. Patient reports she and her spouse are  but has maintained a good relationship. Patient provided RN BRANDON with the spouse's contact number to verify.   RN NN spoke with the patient's spouse Vasile Seymour. Spouse confirmed that his home is an option for her to go at d/c.   Outpatient Services: [place], [phone]  Medication management APRN Natalie Souza, 12/17/24 @ 9:10am  Therapy Dayron Parsons, 12/26/24 @9:00am  Transportation:  Patient reports her mother Kirstin Montenegro or her Spouse are available to provide transportation at discharge   Pharmacy:  [facility] - [place], [place] - [address] - [phone]   Patient verified pharm  Task: